# Patient Record
Sex: FEMALE | Race: OTHER | Employment: OTHER | URBAN - METROPOLITAN AREA
[De-identification: names, ages, dates, MRNs, and addresses within clinical notes are randomized per-mention and may not be internally consistent; named-entity substitution may affect disease eponyms.]

---

## 2022-05-13 ENCOUNTER — APPOINTMENT (OUTPATIENT)
Dept: GENERAL RADIOLOGY | Facility: HOSPITAL | Age: 62
End: 2022-05-13
Attending: EMERGENCY MEDICINE
Payer: COMMERCIAL

## 2022-05-13 ENCOUNTER — HOSPITAL ENCOUNTER (EMERGENCY)
Facility: HOSPITAL | Age: 62
Discharge: HOME | End: 2022-05-13
Attending: EMERGENCY MEDICINE
Payer: COMMERCIAL

## 2022-05-13 VITALS
DIASTOLIC BLOOD PRESSURE: 64 MMHG | OXYGEN SATURATION: 100 % | TEMPERATURE: 98 F | RESPIRATION RATE: 20 BRPM | BODY MASS INDEX: 43.4 KG/M2 | HEIGHT: 69 IN | HEART RATE: 70 BPM | SYSTOLIC BLOOD PRESSURE: 126 MMHG | WEIGHT: 293 LBS

## 2022-05-13 DIAGNOSIS — S83.8X1A MENISCAL INJURY, RIGHT, INITIAL ENCOUNTER: ICD-10-CM

## 2022-05-13 DIAGNOSIS — S83.92XA SPRAIN OF LEFT KNEE, UNSPECIFIED LIGAMENT, INITIAL ENCOUNTER: ICD-10-CM

## 2022-05-13 DIAGNOSIS — S83.421A SPRAIN OF LATERAL COLLATERAL LIGAMENT OF RIGHT KNEE, INITIAL ENCOUNTER: Primary | ICD-10-CM

## 2022-05-13 PROCEDURE — 99283 EMERGENCY DEPT VISIT LOW MDM: CPT

## 2022-05-13 PROCEDURE — 73562 X-RAY EXAM OF KNEE 3: CPT | Performed by: EMERGENCY MEDICINE

## 2022-05-13 PROCEDURE — 99284 EMERGENCY DEPT VISIT MOD MDM: CPT

## 2022-05-13 RX ORDER — LEVOTHYROXINE SODIUM 0.07 MG/1
75 TABLET ORAL
COMMUNITY

## 2022-05-13 RX ORDER — MELOXICAM 7.5 MG/1
7.5 TABLET ORAL DAILY
COMMUNITY

## 2022-05-13 RX ORDER — HYDROCODONE BITARTRATE AND ACETAMINOPHEN 5; 325 MG/1; MG/1
1 TABLET ORAL ONCE
Status: COMPLETED | OUTPATIENT
Start: 2022-05-13 | End: 2022-05-13

## 2022-05-13 RX ORDER — CHOLECALCIFEROL (VITAMIN D3) 1250 MCG
CAPSULE ORAL
COMMUNITY

## 2023-02-28 ENCOUNTER — OFFICE VISIT (OUTPATIENT)
Dept: FAMILY MEDICINE CLINIC | Facility: CLINIC | Age: 63
End: 2023-02-28
Payer: COMMERCIAL

## 2023-02-28 VITALS
RESPIRATION RATE: 16 BRPM | DIASTOLIC BLOOD PRESSURE: 82 MMHG | WEIGHT: 293 LBS | TEMPERATURE: 98 F | HEART RATE: 76 BPM | SYSTOLIC BLOOD PRESSURE: 128 MMHG | HEIGHT: 69 IN | BODY MASS INDEX: 43.4 KG/M2 | OXYGEN SATURATION: 96 %

## 2023-02-28 DIAGNOSIS — L92.0 GRANULOMA ANNULARE: Primary | ICD-10-CM

## 2023-02-28 PROCEDURE — 3079F DIAST BP 80-89 MM HG: CPT | Performed by: NURSE PRACTITIONER

## 2023-02-28 PROCEDURE — 3008F BODY MASS INDEX DOCD: CPT | Performed by: NURSE PRACTITIONER

## 2023-02-28 PROCEDURE — 3074F SYST BP LT 130 MM HG: CPT | Performed by: NURSE PRACTITIONER

## 2023-02-28 PROCEDURE — 99202 OFFICE O/P NEW SF 15 MIN: CPT | Performed by: NURSE PRACTITIONER

## 2023-02-28 RX ORDER — ASPIRIN 81 MG/1
TABLET, CHEWABLE ORAL
COMMUNITY

## 2023-02-28 RX ORDER — OLMESARTAN MEDOXOMIL AND HYDROCHLOROTHIAZIDE 20/12.5 20; 12.5 MG/1; MG/1
1 TABLET ORAL EVERY MORNING
COMMUNITY
Start: 2023-01-16

## 2023-02-28 RX ORDER — TRAZODONE HYDROCHLORIDE 150 MG/1
150 TABLET ORAL AS NEEDED
COMMUNITY

## 2023-02-28 RX ORDER — AMLODIPINE BESYLATE 10 MG/1
10 TABLET ORAL EVERY MORNING
COMMUNITY
Start: 2023-01-19

## 2023-02-28 RX ORDER — TRIAMCINOLONE ACETONIDE 1 MG/G
CREAM TOPICAL 2 TIMES DAILY
Qty: 60 G | Refills: 1 | Status: SHIPPED | OUTPATIENT
Start: 2023-02-28 | End: 2023-03-21

## 2023-02-28 NOTE — PATIENT INSTRUCTIONS
Please apply topical steroid to any circular area twice daily for three weeks. Use good lubrication such as Cetaphil lotion, Aquaphor or Eucerin. Hydrate well to help with skin dryness. Hot water will activate itch. Use cold pack to area for comfort if needed. Take Zyrtec at bedtime if night time itch is bothersome. May stop if resolved. Follow up with new PCP when in [place]. What is granuloma annulare? Granuloma annulare is a skin condition that can cause a red, raised, [place]-shaped rash. There are 2 main types of granuloma annulare:  ? Localized - The rash affects only 1 part of the body. This type of granuloma annulare is much more common. ?Generalized - The rash affects more than 1 part of the body. This type of granuloma annulare is less common. This condition can last for a while. Most of the time, the localized type of granuloma annulare goes away by itself within 2 years. Granuloma annulare can happen in children and adults. Doctors don't know what causes it. What are the symptoms of granuloma annulare? Symptoms depend on the type of granuloma annulare. Localized granuloma annulare most commonly causes 1 or more [place]-shaped areas of red, raised skin that are not scaly. The center of the area is usually skin-colored. Localized granuloma annulare usually affects the wrists, ankles, backs of the hands, and tops of the feet. Generalized granuloma annulare can cause different types of rashes and skin symptoms. Most commonly, it causes many red, raised, [place]- or arc-shaped areas on different parts of the body. This rash is sometimes itchy. Will I need tests? Not always. Your doctor or nurse might be able to tell if you have this condition by doing an exam and looking at your rash. If your doctor or nurse is not sure whether you have granuloma annulare, they might scrape your skin and look at the scrapings under a microscope.  This test helps tell whether you have an infection that can look like granuloma annulare. Some people have a test called a skin biopsy. For this test, a doctor takes a small sample of skin from the rash. Then another doctor looks at it under a microscope. How is granuloma annulare treated? Treatment depends on the type of granuloma annulare you have and how bothersome your symptoms are. If your rash is not itchy or bothersome, you probably do not need treatment. Your rash will most likely go away by itself over time. If your symptoms bother you, there are different treatments that might help. Treatment for localized granuloma annulare can include:  ?Steroid ointments or creams that go on the rash - These are not the same as the steroids some athletes take illegally. These medicines help reduce inflammation and itching. ? Shots of steroid medicines that go into the rash  Treatment for generalized granuloma annulare can include:  ?Steroid ointments or creams that go on the rash  ? Light therapy - For this treatment, your doctor will use a machine that gives off a special type of light. ?Steroid pills  ? Other types of medicines  Will my rash return? In some cases, the rash returns after treatment.